# Patient Record
(demographics unavailable — no encounter records)

---

## 2024-12-12 NOTE — PLAN
[TextEntry] : The natural progression of osteoarthritis was explained to the patient.  We discussed the possible treatment options from conservative to operative.  These included NSAIDs, Glucosamine and Chondroitin sulfate, and physical therapy as well different types of injections.  We also discussed that at some point they may progress to need a TKA.  Information and pamphlets were given.   Cortisone injection given today, right knee.  Medication was injected into the above treated area. After verbal consent using sterile preparation and technique. The risks, benefits, and alternatives to cortisone injection were explained in full to the patient. Risks outlined include but are not limited to infection, sepsis, bleeding, scarring, skin discoloration, temporary increase in pain, syncopal episode, failure to resolve symptoms, allergic reaction, symptom recurrence, and elevation of blood sugar in diabetics. Patient understood the risks. All questions were answered. After discussion of options, patient requested an injection. Oral informed consent was obtained and sterile prep was done of the injection site. Sterile technique was utilized for the procedure including the preparation of the solutions used for the injection. Patient tolerated the procedure well. Advised to ice the injection site this evening. Prep with Betadine locally to site. Sterile technique used. Patient tolerated procedure well. Post Procedure Instructions: Patient was advised to call if redness, pain, or fever occur and apply ice for 15 min. out of every hour for the next 12-24 hours as tolerated.

## 2024-12-12 NOTE — PHYSICAL EXAM
[Normal Mood and Affect] : normal mood and affect [Able to Communicate] : able to communicate [Well Developed] : well developed [Well Nourished] : well nourished [Right] : right knee [Left] : left knee [NL (0)] : extension 0 degrees [5___] : hamstring 5[unfilled]/5 [5mm] : openinmm [Bilateral] : knee bilaterally [AP] : anteroposterior [Lateral] : lateral [[place]] : skyline [Degenerative change] : Degenerative change [] : negative Lachmann [FreeTextEntry9] : Severe OA medial compartment.  Moderate PF.  [TWNoteComboBox7] : flexion 135 degrees

## 2024-12-12 NOTE — HISTORY OF PRESENT ILLNESS
[0] : 0 [5] : 5 [Dull/Aching] : dull/aching [Sharp] : sharp [Throbbing] : throbbing [Standing] : standing [Stairs] : stairs [Full time] : Work status: full time [de-identified] : 12/12/24:  Patient returns today complaining of recurrent bilateral knee pain.  Last cortisone injection to the right knee over five months ago. States R is worse than L.  Would like to repeat right knee injection today.   Her left knee is doing okay.  07/08/24:  Returns today c/o recurring bilateral knee pain, rt >> lt, when walking. Last cortisone injection x 6 months ago which helped until now. Would like to repeat the injection. Takes tylenol prn.  1/11/24  Initial visit for this 70 year old female, with LEP, c/o bilateral knee pain , rt > lt x many years duration. Worse walking and at night. Constant and daily.  Tried Tylenol prn w/o relief. Did have a cortisone injection rt knee > 2 years ago with temporary relief. 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

## 2024-12-12 NOTE — HISTORY OF PRESENT ILLNESS
[0] : 0 [5] : 5 [Dull/Aching] : dull/aching [Sharp] : sharp [Throbbing] : throbbing [Standing] : standing [Stairs] : stairs [Full time] : Work status: full time [de-identified] : 12/12/24:  Patient returns today complaining of recurrent bilateral knee pain.  Last cortisone injection to the right knee over five months ago. States R is worse than L.  Would like to repeat right knee injection today.   Her left knee is doing okay.  07/08/24:  Returns today c/o recurring bilateral knee pain, rt >> lt, when walking. Last cortisone injection x 6 months ago which helped until now. Would like to repeat the injection. Takes tylenol prn.  1/11/24  Initial visit for this 70 year old female, with LEP, c/o bilateral knee pain , rt > lt x many years duration. Worse walking and at night. Constant and daily.  Tried Tylenol prn w/o relief. Did have a cortisone injection rt knee > 2 years ago with temporary relief. 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

## 2024-12-12 NOTE — PHYSICAL EXAM
[Normal Mood and Affect] : normal mood and affect [Able to Communicate] : able to communicate [Well Developed] : well developed [Well Nourished] : well nourished [Right] : right knee [Left] : left knee [NL (0)] : extension 0 degrees [5___] : hamstring 5[unfilled]/5 [5mm] : openinmm [Bilateral] : knee bilaterally [AP] : anteroposterior [Lateral] : lateral [[place]] : skyline [Degenerative change] : Degenerative change [] : negative Lachmann [FreeTextEntry9] : Severe OA medial compartment.  Moderate PF.  [TWNoteComboBox7] : flexion 135 degrees

## 2025-03-05 NOTE — ASSESSMENT
[FreeTextEntry1] : Left carpal tunnel syndrome I am advising her to restart using the wrist brace at night when she sleeps She will return for updated EMG and nerve conduction studies Further discussions to follow

## 2025-03-05 NOTE — PHYSICAL EXAM
[FreeTextEntry1] : There is full range of movement of the cervical spine. There is no cervical palpation tenderness. Tinel sign negative at the wrists and elbows. Phalen sign negative at the wrists. Adson's maneuver negative bilaterally. [General Appearance - Alert] : alert [General Appearance - In No Acute Distress] : in no acute distress [General Appearance - Well-Appearing] : healthy appearing [Oriented To Time, Place, And Person] : oriented to person, place, and time [Impaired Insight] : insight and judgment were intact [Affect] : the affect was normal [Memory Recent] : recent memory was not impaired [Person] : oriented to person [Place] : oriented to place [Time] : oriented to time [Concentration Intact] : normal concentrating ability [Visual Intact] : visual attention was ~T not ~L decreased [Fluency] : fluency intact [Comprehension] : comprehension intact [Reading] : difficulty reading [Past History] : adequate knowledge of personal past history [Cranial Nerves Optic (II)] : visual acuity intact bilaterally,  visual fields full to confrontation, pupils equal round and reactive to light [Cranial Nerves Oculomotor (III)] : extraocular motion intact [Cranial Nerves Trigeminal (V)] : facial sensation intact symmetrically [Cranial Nerves Facial (VII)] : face symmetrical [Cranial Nerves Vestibulocochlear (VIII)] : hearing was intact bilaterally [Cranial Nerves Glossopharyngeal (IX)] : tongue and palate midline [Cranial Nerves Accessory (XI - Cranial And Spinal)] : head turning and shoulder shrug symmetric [Cranial Nerves Hypoglossal (XII)] : there was no tongue deviation with protrusion [Motor Tone] : muscle tone was normal in all four extremities [Motor Strength] : muscle strength was normal in all four extremities [No Muscle Atrophy] : normal bulk in all four extremities [Paresis Pronator Drift Right-Sided] : no pronator drift on the right [Paresis Pronator Drift Left-Sided] : no pronator drift on the left [Sensation Tactile Decrease] : light touch was intact [Sensation Pain / Temperature Decrease] : pain and temperature was intact [Romberg's Sign] : Romberg's sign was negtive [Abnormal Walk] : normal gait [Balance] : balance was intact [Past-pointing] : there was no past-pointing [Tremor] : no tremor present [Coordination - Dysmetria Impaired Finger-to-Nose Bilateral] : not present [2+] : Patella left 2+ [PERRL With Normal Accommodation] : pupils were equal in size, round, reactive to light, with normal accommodation [Extraocular Movements] : extraocular movements were intact [Full Visual Field] : full visual field

## 2025-03-05 NOTE — HISTORY OF PRESENT ILLNESS
[FreeTextEntry1] : I had seen her about 6 years ago for some numbness of the left hand with EMG showing severe left carpal tunnel syndrome She had good response to use of a wrist brace and continued with that.  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

## 2025-05-28 NOTE — PHYSICAL EXAM
[de-identified] : Exam [left] wrist + Durkan's with + N/T. There is + tinel. Patient is able to delineate numbness to median-sided digits volarly. [No obvious thenar atrophy] Examination of the [bilateral] knee reveals [tenderness with compression of the patella] w/ bilateral JLT ROM is [0-130] [de-identified] : [4] views of [bilateral hands and wrists] were obtained today in my office and were seen by me and discussed with the patient.  These [show findings consistent with bilateral basal joint OA and findings of IP joint OA]

## 2025-05-28 NOTE — HISTORY OF PRESENT ILLNESS
[FreeTextEntry1] : Raúl is a pleasant is a pleasant female p/w significant wrist and hand discomfort as well as significant bilateral knee discomfort difficulty with ambulation.  She states has had injections in the past which have been helpful.

## 2025-05-28 NOTE — ASSESSMENT
[FreeTextEntry1] : ASSESSMENT: The patient comes in today with chronic exacerbated complaints of wrist and hand discomfort as well as bilateral knee discomfort.  We have discussed significant knee arthropathy findings as well as carpal tunnel symptoms.  We have discussed bracing and activity modification.  Today the patient does elect for injections.  Denies diabetes.   The patient was adequately and thoroughly informed of my assessment of their current condition(s).  - This may diminish bodily function for the extremity. We discussed prognosis, tx modalities including operative and nonoperative options for the above diagnostic assessment. As always, 2nd opinion is always provided as an option.  When accessible, I was able to review other physicians note(s) including reviewing other tests, imaging results as well as personally view these results for my own interpretation.   Injection procedure for left carpal tunnel:   The risks and benefits of a steroid injection were discussed in detail. The risks include but are not limited to: pain, infection, swelling, flare response, bleeding, subcutaneous fat atrophy, skin depigmentation and/or elevation of blood sugar. The risk of incomplete resolution of symptoms, recurrence and additional intervention was reviewed and considered by the patient. The patient agreed to proceed and under a sterile prep, I injected 1 unit 6mg into 1 cc of a combination of Celestone and Lidocaine into the above stated location for the procedure. The patient tolerated the injection well.  Injection bilateral knee large joint:   The risks and benefits of a steroid injection were discussed in detail. The risks include but are not limited to: pain, infection, swelling, flare response, bleeding, subcutaneous fat atrophy, skin depigmentation and/or elevation of blood sugar. The risk of incomplete resolution of symptoms, recurrence and additional intervention was reviewed and considered by the patient. The patient agreed to proceed and under a sterile prep, I injected 1 unit 10mg into 5 cc of a combination of Kenalog and Lidocaine into the above-stated location. The patient tolerated the injection well.  The patient was adequately and thoroughly informed of my assessment of their current condition(s).  DISCUSSION: 1.  Left carpal tunnel and bilateral knee joint injections.  Activity modification and bracing 2.  Follow-up 6 weeks 3. [x]

## 2025-07-02 NOTE — REASON FOR VISIT
[Follow-up] : a follow-up of an existing diagnosis [FreeTextEntry1] : Left lower quadrant abdominal pain, GERD

## 2025-07-02 NOTE — PHYSICAL EXAM
[Alert] : alert [Normal Voice/Communication] : normal voice/communication [Healthy Appearing] : healthy appearing [No Acute Distress] : no acute distress [Sclera] : the sclera and conjunctiva were normal [Hearing Threshold Finger Rub Not [place]] : hearing was normal [Normal Lips/Gums] : the lips and gums were normal [Oropharynx] : the oropharynx was normal [Normal Appearance] : the appearance of the neck was normal [No Neck Mass] : no neck mass was observed [No Respiratory Distress] : no respiratory distress [No Acc Muscle Use] : no accessory muscle use [Respiration, Rhythm And Depth] : normal respiratory rhythm and effort [Auscultation Breath Sounds / Voice Sounds] : lungs were clear to auscultation bilaterally [Heart Rate And Rhythm] : heart rate was normal and rhythm regular [Normal S1, S2] : normal S1 and S2 [Murmurs] : no murmurs [Bowel Sounds] : normal bowel sounds [No Masses] : no abdominal mass palpated [Abdomen Soft] : soft [] : no hepatosplenomegaly [Rebound Tenderness] : no rebound tenderness [LLQ] : LLQ [Oriented To Time, Place, And Person] : oriented to person, place, and time

## 2025-07-02 NOTE — HISTORY OF PRESENT ILLNESS
[FreeTextEntry1] : 72-year-old woman with a history of osteoporosis and hypertension who presents for discussion of GERD and left lower quadrant abdominal pain.  Patient with longstanding acid reflux review looks of acid and food up into her chest.  She reports significant dietary changes and is having issues maybe once per week at this point.  She denies nausea, vomiting.  She does not wake up at night with any issues.  She had an endoscopy about 10 years ago which was unremarkable.  Patient also with new onset left lower quadrant abdominal pain that has been ongoing for a few months.  She reports seeing her primary care doctor who sent her for blood work and imaging with a CT scan or an MRI of her abdomen.  The CT scan/MRI was done in May and she was told that it was normal.  She also had a transvaginal ultrasound which she was also told was normal.  None of these records are available for review at this time.  Patient reports that she is continues to have this left lower quadrant abdominal pain without diarrhea or constipation.  She denies blood in her stool.  She denies family history of malignancy.  She reports most recent colonoscopy was about 7 or so years ago and reportedly was normal.

## 2025-07-02 NOTE — ASSESSMENT
[FreeTextEntry1] : Reviewed prior records including physical papers that patient has brought in with CBC, after visit summary discussing phlebotomy for iron overload.  Reviewed prior imaging in our system including 2023 MRI with pancreatic cyst but no liver abnormalities.  Discussed with patient that acute onset left lower quadrant abdominal pain could be related to diverticulitis or other inflammation or infection and if no abnormalities on that could evaluate with colonoscopy.  Will request the CT scan which patient has reported is normal and if this is in fact normal we will plan for a colonoscopy for evaluation of abdominal pain. Discussed risks and benefits of procedure including infection, bleeding, perforation.  Patient is in agreement with plan for procedure.  Preparation sent to pharmacy.    GERD: Reviewed prior labs and notes in chart. Plan for endoscopic evaluation to assess for esophagitis, Maguire's esophagus, gastritis, h pylori, GIM or ulcerations considering longstanding GERD.  Discussed risks and benefits of the procedure including infection, bleeding and perforation and patient is in agreement with plan for EGD.